# Patient Record
Sex: FEMALE | Race: WHITE
[De-identification: names, ages, dates, MRNs, and addresses within clinical notes are randomized per-mention and may not be internally consistent; named-entity substitution may affect disease eponyms.]

---

## 2020-06-06 ENCOUNTER — HOSPITAL ENCOUNTER (EMERGENCY)
Dept: HOSPITAL 63 - ER | Age: 35
Discharge: HOME | End: 2020-06-06
Payer: OTHER GOVERNMENT

## 2020-06-06 VITALS — SYSTOLIC BLOOD PRESSURE: 110 MMHG | DIASTOLIC BLOOD PRESSURE: 70 MMHG

## 2020-06-06 VITALS — WEIGHT: 137.13 LBS | BODY MASS INDEX: 22.04 KG/M2 | HEIGHT: 66 IN
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
## 2020-06-06 DIAGNOSIS — S61.211A: Primary | ICD-10-CM
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
## 2020-06-06 PROCEDURE — 99283 EMERGENCY DEPT VISIT LOW MDM: CPT

## 2020-06-06 PROCEDURE — 12001 RPR S/N/AX/GEN/TRNK 2.5CM/<: CPT

## 2020-06-06 NOTE — PHYS DOC
Past History


Past Medical History:  No Pertinent History


Past Surgical History:  Tonsillectomy


Additional Past Surgical Histo:  PRK-2009


Alcohol Use:  Rarely





General Adult


EDM:


Chief Complaint:  LACERATION/AVULSION





HPI:


HPI:


"..I was going after this bush...but I got the tip of my finger...."  





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 

above hx and complaints of laceration 1-1/2 cm of left index finger.  Patient 

cut it on clippers she was using to trim bushes.  Distal neurovascular intact.  

Is up-to-date with tetanus.  Normally follows at [place].  No history 

immunosuppression.  Patient is right-hand dominant.  Injury occurred just before

arrival.  No recent travel outside the [place] area.  Does plan to move in 3

weeks.  No recent overseas assignments.





Review of Systems:


Review of Systems:


Constitutional:  Denies fever or chills 


Eyes:  Denies change in visual acuity 


HENT:  Denies nasal congestion or sore throat 


Respiratory:  Denies cough or shortness of breath 


Cardiovascular:  Denies chest pain or edema 


GI:  Denies abdominal pain, nausea, vomiting, bloody stools or diarrhea 


: Denies dysuria 


Musculoskeletal:  Denies back pain or joint pain 


Integument:  Denies rash .  Complaints of finger laceration.


Neurologic:  Denies headache, focal weakness or sensory changes 


Endocrine:  Denies polyuria or polydipsia 


Lymphatic:  Denies swollen glands 


Psychiatric:  Denies depression or anxiety





Heart Score:


Risk Factors:


Risk Factors:  DM, Current or recent (<one month) smoker, HTN, HLP, family 

history of CAD, obesity.


Risk Scores:


Score 0 - 3:  2.5% MACE over next 6 weeks - Discharge Home


Score 4 - 6:  20.3% MACE over next 6 weeks - Admit for Clinical Observation


Score 7 - 10:  72.7% MACE over next 6 weeks - Early Invasive Strategies





Family History:


Family History:


Noncontributory to presentation





Current Medications:


Current Meds:


See nursing for home meds





Allergies:


Allergies:





Allergies








Coded Allergies Type Severity Reaction Last Updated Verified


 


  No Known Drug Allergies    6/6/20 No











Physical Exam:


PE:





Constitutional: Well developed, well nourished, moderate acute distress, non-

toxic appearance. []


HENT: Normocephalic, atraumatic, bilateral external ears normal, oropharynx 

moist, no oral exudates, nose normal. []


Eyes: PERRLA, EOMI, conjunctiva normal, no discharge. [] 


Neck: Normal range of motion, no tenderness, supple, no stridor. [] 


Cardiovascular:Heart rate regular rhythm, no murmur []


Lungs & Thorax:  Bilateral breath sounds clear to auscultation []


Abdomen: Bowel sounds normal, soft, no tenderness, no masses, no pulsatile 

masses. [] 


Skin: Warm, dry, no erythema, no rash. [] 


Back: No tenderness, no CVA tenderness. [] 


Extremities: No tenderness, no cyanosis, no clubbing, ROM intact, no edema. [] 

Except findings of laceration left index finger pad


Neurologic: Alert and oriented X 3, normal motor function, normal sensory 

function, no focal deficits noted. []


Psychologic: Affect normal, judgement normal, mood normal. []





Current Patient Data:


Vital Signs:





                                   Vital Signs








  Date Time  Temp Pulse Resp B/P (MAP) Pulse Ox O2 Delivery O2 Flow Rate FiO2


 


6/6/20 19:55 99.4 94 20 119/69 (86) 97 Room Air  











EKG:


EKG:


[]





Radiology/Procedures:


Radiology/Procedures:


[]





Course & Med Decision Making:


Course & Med Decision Making


Pertinent Labs and Imaging studies reviewed. (See chart for details)





Procedure note-


Laceration cleaned extensively with running tap water and surgical soap.  Wound 

was re-cleaned with Betadine.  Digital block as well as local incision injected 

with 2% lidocaine.  Reirrigated finger.  Closed finger with five 4-0 Vicryl 

simple sutures.  Dressing applied.  Patient monitor closely for infection.  

Patient will be started on Keflex 500 mg 3 times a day.  Patient take Tylenol 

and ibuprofen for pain.  Return if any concerns.  Sutures will dissolve will not

 need removal.  Patient apply Polysporin 4 times a day after this dressing 

removed.  Must keep finger clean and dry.  Return if any concerns.  Follow-up 

[facility].








Impression:





1. 1 & 1/2 cm laceration of left index finger pad








[]





Dragon Disclaimer:


Dragon Disclaimer:


This electronic medical record was generated, in whole or in part, using a voice

 recognition dictation system.





Departure


Departure:


Disposition:  01 HOME/RESIDENCE PRIOR TO ADM


Condition:  STABLE


Referrals:  


PCP,UNKNOWN (PCP)


Scripts


Bacitracin/Polymyxin B Sulfate (POLYSPORIN OINTMENT) 28.3 Gm Oint...g.


28.3 GM TP QID for infection for 90 Days, MISC


   Prov: IGNACIO MCCONNELL MD         6/6/20 


Ibuprofen (IBUPROFEN) 800 Mg Tablet


800 MG PO TID for pain or fever, #120 TAB


   Prov: IGNACIO MCCONNELL MD         6/6/20 


Cephalexin (KEFLEX) 500 Mg Capsule


500 MG PO TID for infection for 5 Days, BOTTLE


   Prov: IGNACIO MCCONNELL MD         6/6/20





Justification of Admission:


Justification of Admission:


Justification of Admission Dx:  N/A





Dragon Disclaimer


This chart was dictated in whole or in part using Voice Recognition software in 

a busy, high-work load, and often noisy Emergency Department environment.  It 

may contain unintended and wholly unrecognized errors or omissions.





Dragon Disclaimer


This chart was dictated in whole or in part using Voice Recognition software in 

a busy, high-work load, and often noisy Emergency Department environment.  It ma

y contain unintended and wholly unrecognized errors or omissions.











IGNACIO MCCONNELL MD            Jun 6, 2020 20:26